# Patient Record
Sex: FEMALE | Race: WHITE | NOT HISPANIC OR LATINO | URBAN - METROPOLITAN AREA
[De-identification: names, ages, dates, MRNs, and addresses within clinical notes are randomized per-mention and may not be internally consistent; named-entity substitution may affect disease eponyms.]

---

## 2017-10-30 ENCOUNTER — INPATIENT (INPATIENT)
Facility: HOSPITAL | Age: 37
LOS: 3 days | Discharge: HOME | End: 2017-11-03
Attending: OBSTETRICS & GYNECOLOGY | Admitting: OBSTETRICS & GYNECOLOGY
Payer: MEDICAID

## 2017-10-30 VITALS
SYSTOLIC BLOOD PRESSURE: 141 MMHG | OXYGEN SATURATION: 100 % | RESPIRATION RATE: 16 BRPM | TEMPERATURE: 98 F | HEART RATE: 84 BPM | DIASTOLIC BLOOD PRESSURE: 94 MMHG

## 2017-10-30 DIAGNOSIS — D64.9 ANEMIA, UNSPECIFIED: ICD-10-CM

## 2017-10-30 LAB
ALBUMIN SERPL ELPH-MCNC: 2.9 G/DL — LOW (ref 3.3–5)
ALP SERPL-CCNC: 320 U/L — HIGH (ref 40–120)
ALT FLD-CCNC: 46 U/L — HIGH (ref 4–33)
ANISOCYTOSIS BLD QL: SLIGHT — SIGNIFICANT CHANGE UP
APTT BLD: 27.6 SEC — SIGNIFICANT CHANGE UP (ref 27.5–37.4)
AST SERPL-CCNC: 51 U/L — HIGH (ref 4–32)
BASE EXCESS BLDV CALC-SCNC: -1.1 MMOL/L — SIGNIFICANT CHANGE UP
BASOPHILS # BLD AUTO: 0.02 K/UL — SIGNIFICANT CHANGE UP (ref 0–0.2)
BASOPHILS # BLD AUTO: 0.04 K/UL — SIGNIFICANT CHANGE UP (ref 0–0.2)
BASOPHILS NFR BLD AUTO: 0.3 % — SIGNIFICANT CHANGE UP (ref 0–2)
BASOPHILS NFR BLD AUTO: 0.5 % — SIGNIFICANT CHANGE UP (ref 0–2)
BASOPHILS NFR SPEC: 3 % — HIGH (ref 0–2)
BILIRUB SERPL-MCNC: 0.3 MG/DL — SIGNIFICANT CHANGE UP (ref 0.2–1.2)
BLOOD GAS VENOUS - CREATININE: 0.71 MG/DL — SIGNIFICANT CHANGE UP (ref 0.5–1.3)
BUN SERPL-MCNC: 10 MG/DL — SIGNIFICANT CHANGE UP (ref 7–23)
CALCIUM SERPL-MCNC: 8.2 MG/DL — LOW (ref 8.4–10.5)
CHLORIDE BLDV-SCNC: 107 MMOL/L — SIGNIFICANT CHANGE UP (ref 96–108)
CHLORIDE SERPL-SCNC: 103 MMOL/L — SIGNIFICANT CHANGE UP (ref 98–107)
CO2 SERPL-SCNC: 22 MMOL/L — SIGNIFICANT CHANGE UP (ref 22–31)
CREAT ?TM UR-MCNC: 79.4 MG/DL — SIGNIFICANT CHANGE UP
CREAT SERPL-MCNC: 0.74 MG/DL — SIGNIFICANT CHANGE UP (ref 0.5–1.3)
EOSINOPHIL # BLD AUTO: 0.34 K/UL — SIGNIFICANT CHANGE UP (ref 0–0.5)
EOSINOPHIL # BLD AUTO: 0.49 K/UL — SIGNIFICANT CHANGE UP (ref 0–0.5)
EOSINOPHIL NFR BLD AUTO: 4 % — SIGNIFICANT CHANGE UP (ref 0–6)
EOSINOPHIL NFR BLD AUTO: 6.8 % — HIGH (ref 0–6)
EOSINOPHIL NFR FLD: 10 % — HIGH (ref 0–6)
FIBRINOGEN PPP-MCNC: 795 MG/DL — HIGH (ref 310–510)
GAS PNL BLDV: 135 MMOL/L — LOW (ref 136–146)
GLUCOSE BLDV-MCNC: 84 — SIGNIFICANT CHANGE UP (ref 70–99)
GLUCOSE SERPL-MCNC: 79 MG/DL — SIGNIFICANT CHANGE UP (ref 70–99)
HCG SERPL-ACNC: SIGNIFICANT CHANGE UP MIU/ML
HCO3 BLDV-SCNC: 22 MMOL/L — SIGNIFICANT CHANGE UP (ref 20–27)
HCT VFR BLD CALC: 24.5 % — LOW (ref 34.5–45)
HCT VFR BLD CALC: 26.2 % — LOW (ref 34.5–45)
HCT VFR BLDV CALC: 25.6 % — LOW (ref 34.5–45)
HGB BLD-MCNC: 7 G/DL — CRITICAL LOW (ref 11.5–15.5)
HGB BLD-MCNC: 8 G/DL — LOW (ref 11.5–15.5)
HGB BLDV-MCNC: 8.2 G/DL — LOW (ref 11.5–15.5)
HYPOCHROMIA BLD QL: SLIGHT — SIGNIFICANT CHANGE UP
IMM GRANULOCYTES # BLD AUTO: 0.03 # — SIGNIFICANT CHANGE UP
IMM GRANULOCYTES # BLD AUTO: 0.04 # — SIGNIFICANT CHANGE UP
IMM GRANULOCYTES NFR BLD AUTO: 0.4 % — SIGNIFICANT CHANGE UP (ref 0–1.5)
IMM GRANULOCYTES NFR BLD AUTO: 0.5 % — SIGNIFICANT CHANGE UP (ref 0–1.5)
INR BLD: 1.03 — SIGNIFICANT CHANGE UP (ref 0.88–1.17)
LACTATE BLDV-MCNC: 0.8 MMOL/L — SIGNIFICANT CHANGE UP (ref 0.5–2)
LDH SERPL L TO P-CCNC: 303 U/L — HIGH (ref 135–225)
LYMPHOCYTES # BLD AUTO: 3.06 K/UL — SIGNIFICANT CHANGE UP (ref 1–3.3)
LYMPHOCYTES # BLD AUTO: 3.11 K/UL — SIGNIFICANT CHANGE UP (ref 1–3.3)
LYMPHOCYTES # BLD AUTO: 36.5 % — SIGNIFICANT CHANGE UP (ref 13–44)
LYMPHOCYTES # BLD AUTO: 42.4 % — SIGNIFICANT CHANGE UP (ref 13–44)
LYMPHOCYTES NFR SPEC AUTO: 37 % — SIGNIFICANT CHANGE UP (ref 13–44)
MANUAL SMEAR VERIFICATION: YES — SIGNIFICANT CHANGE UP
MCHC RBC-ENTMCNC: 20.6 PG — LOW (ref 27–34)
MCHC RBC-ENTMCNC: 22.2 PG — LOW (ref 27–34)
MCHC RBC-ENTMCNC: 28.6 % — LOW (ref 32–36)
MCHC RBC-ENTMCNC: 30.5 % — LOW (ref 32–36)
MCV RBC AUTO: 72.3 FL — LOW (ref 80–100)
MCV RBC AUTO: 72.8 FL — LOW (ref 80–100)
MICROCYTES BLD QL: SLIGHT — SIGNIFICANT CHANGE UP
MONOCYTES # BLD AUTO: 0.74 K/UL — SIGNIFICANT CHANGE UP (ref 0–0.9)
MONOCYTES # BLD AUTO: 0.96 K/UL — HIGH (ref 0–0.9)
MONOCYTES NFR BLD AUTO: 10.3 % — SIGNIFICANT CHANGE UP (ref 2–14)
MONOCYTES NFR BLD AUTO: 11.3 % — SIGNIFICANT CHANGE UP (ref 2–14)
MONOCYTES NFR BLD: 7 % — SIGNIFICANT CHANGE UP (ref 2–9)
NEUTROPHIL AB SER-ACNC: 38 % — LOW (ref 43–77)
NEUTROPHILS # BLD AUTO: 2.87 K/UL — SIGNIFICANT CHANGE UP (ref 1.8–7.4)
NEUTROPHILS # BLD AUTO: 4.02 K/UL — SIGNIFICANT CHANGE UP (ref 1.8–7.4)
NEUTROPHILS NFR BLD AUTO: 39.8 % — LOW (ref 43–77)
NEUTROPHILS NFR BLD AUTO: 47.2 % — SIGNIFICANT CHANGE UP (ref 43–77)
NRBC # BLD: 3 /100WBC — SIGNIFICANT CHANGE UP
NRBC # FLD: 0.03 — SIGNIFICANT CHANGE UP
NRBC # FLD: 0.05 — SIGNIFICANT CHANGE UP
PCO2 BLDV: 43 MMHG — SIGNIFICANT CHANGE UP (ref 41–51)
PH BLDV: 7.36 PH — SIGNIFICANT CHANGE UP (ref 7.32–7.43)
PLATELET # BLD AUTO: 346 K/UL — SIGNIFICANT CHANGE UP (ref 150–400)
PLATELET # BLD AUTO: 372 K/UL — SIGNIFICANT CHANGE UP (ref 150–400)
PLATELET COUNT - ESTIMATE: NORMAL — SIGNIFICANT CHANGE UP
PMV BLD: 11.5 FL — SIGNIFICANT CHANGE UP (ref 7–13)
PMV BLD: 11.5 FL — SIGNIFICANT CHANGE UP (ref 7–13)
PO2 BLDV: < 24 MMHG — LOW (ref 35–40)
POIKILOCYTOSIS BLD QL AUTO: SLIGHT — SIGNIFICANT CHANGE UP
POTASSIUM BLDV-SCNC: 3.8 MMOL/L — SIGNIFICANT CHANGE UP (ref 3.4–4.5)
POTASSIUM SERPL-MCNC: 4.1 MMOL/L — SIGNIFICANT CHANGE UP (ref 3.5–5.3)
POTASSIUM SERPL-SCNC: 4.1 MMOL/L — SIGNIFICANT CHANGE UP (ref 3.5–5.3)
PROT SERPL-MCNC: 6.7 G/DL — SIGNIFICANT CHANGE UP (ref 6–8.3)
PROT UR-MCNC: 19 MG/DL — SIGNIFICANT CHANGE UP
PROTHROM AB SERPL-ACNC: 11.5 SEC — SIGNIFICANT CHANGE UP (ref 9.8–13.1)
RBC # BLD: 3.39 M/UL — LOW (ref 3.8–5.2)
RBC # BLD: 3.6 M/UL — LOW (ref 3.8–5.2)
RBC # FLD: 17.4 % — HIGH (ref 10.3–14.5)
RBC # FLD: 18.4 % — HIGH (ref 10.3–14.5)
RH IG SCN BLD-IMP: POSITIVE — SIGNIFICANT CHANGE UP
SAO2 % BLDV: 18.8 % — LOW (ref 60–85)
SODIUM SERPL-SCNC: 137 MMOL/L — SIGNIFICANT CHANGE UP (ref 135–145)
URATE SERPL-MCNC: 4.9 MG/DL — SIGNIFICANT CHANGE UP (ref 2.5–7)
VARIANT LYMPHS # BLD: 5 % — SIGNIFICANT CHANGE UP
WBC # BLD: 7.21 K/UL — SIGNIFICANT CHANGE UP (ref 3.8–10.5)
WBC # BLD: 8.51 K/UL — SIGNIFICANT CHANGE UP (ref 3.8–10.5)
WBC # FLD AUTO: 7.21 K/UL — SIGNIFICANT CHANGE UP (ref 3.8–10.5)
WBC # FLD AUTO: 8.51 K/UL — SIGNIFICANT CHANGE UP (ref 3.8–10.5)

## 2017-10-30 RX ORDER — SODIUM CHLORIDE 9 MG/ML
1000 INJECTION, SOLUTION INTRAVENOUS ONCE
Qty: 0 | Refills: 0 | Status: COMPLETED | OUTPATIENT
Start: 2017-10-30 | End: 2017-10-30

## 2017-10-30 RX ORDER — HYDROMORPHONE HYDROCHLORIDE 2 MG/ML
1 INJECTION INTRAMUSCULAR; INTRAVENOUS; SUBCUTANEOUS
Qty: 0 | Refills: 0 | Status: DISCONTINUED | OUTPATIENT
Start: 2017-10-31 | End: 2017-11-01

## 2017-10-30 RX ORDER — METOCLOPRAMIDE HCL 10 MG
10 TABLET ORAL ONCE
Qty: 0 | Refills: 0 | Status: COMPLETED | OUTPATIENT
Start: 2017-10-30 | End: 2017-10-30

## 2017-10-30 RX ORDER — MAGNESIUM SULFATE 500 MG/ML
4 VIAL (ML) INJECTION ONCE
Qty: 0 | Refills: 0 | Status: COMPLETED | OUTPATIENT
Start: 2017-10-30 | End: 2017-10-30

## 2017-10-30 RX ORDER — OXYCODONE HYDROCHLORIDE 5 MG/1
10 TABLET ORAL
Qty: 0 | Refills: 0 | Status: DISCONTINUED | OUTPATIENT
Start: 2017-10-31 | End: 2017-11-01

## 2017-10-30 RX ORDER — INFLUENZA VIRUS VACCINE 15; 15; 15; 15 UG/.5ML; UG/.5ML; UG/.5ML; UG/.5ML
0.5 SUSPENSION INTRAMUSCULAR ONCE
Qty: 0 | Refills: 0 | Status: COMPLETED | OUTPATIENT
Start: 2017-10-30 | End: 2017-10-30

## 2017-10-30 RX ORDER — MAGNESIUM SULFATE 500 MG/ML
2 VIAL (ML) INJECTION
Qty: 40 | Refills: 0 | Status: DISCONTINUED | OUTPATIENT
Start: 2017-10-30 | End: 2017-10-31

## 2017-10-30 RX ORDER — CITRIC ACID/SODIUM CITRATE 300-500 MG
30 SOLUTION, ORAL ORAL ONCE
Qty: 0 | Refills: 0 | Status: COMPLETED | OUTPATIENT
Start: 2017-10-30 | End: 2017-10-30

## 2017-10-30 RX ORDER — OXYCODONE HYDROCHLORIDE 5 MG/1
5 TABLET ORAL
Qty: 0 | Refills: 0 | Status: DISCONTINUED | OUTPATIENT
Start: 2017-10-31 | End: 2017-11-01

## 2017-10-30 RX ORDER — SODIUM CHLORIDE 9 MG/ML
1000 INJECTION, SOLUTION INTRAVENOUS
Qty: 0 | Refills: 0 | Status: DISCONTINUED | OUTPATIENT
Start: 2017-10-30 | End: 2017-10-31

## 2017-10-30 RX ADMIN — Medication 50 GM/HR: at 21:46

## 2017-10-30 RX ADMIN — OXYCODONE HYDROCHLORIDE 5 MILLIGRAM(S): 5 TABLET ORAL at 23:30

## 2017-10-30 RX ADMIN — SODIUM CHLORIDE 125 MILLILITER(S): 9 INJECTION, SOLUTION INTRAVENOUS at 21:14

## 2017-10-30 RX ADMIN — Medication 30 MILLILITER(S): at 23:50

## 2017-10-30 RX ADMIN — Medication 10 MILLIGRAM(S): at 23:49

## 2017-10-30 RX ADMIN — Medication 300 GRAM(S): at 21:14

## 2017-10-30 RX ADMIN — SODIUM CHLORIDE 2000 MILLILITER(S): 9 INJECTION, SOLUTION INTRAVENOUS at 23:28

## 2017-10-30 NOTE — ED PROVIDER NOTE - MEDICAL DECISION MAKING DETAILS
37yoF 37+weeks pregnant, pw symptomatic anemia with bilateral lower extremity edema. will send labs, type and screen, dvt studies, and likely admit to obgyn. [place]: 38 yo F who is 37+ weeks pregnant, p/w symptomatic anemia with b/l lower extremity edema.   -labs, type and screen, b/l LEs are neg for DVT, admit to obgyn.

## 2017-10-30 NOTE — ED ADULT TRIAGE NOTE - CHIEF COMPLAINT QUOTE
pt amb to triage c/o low Hgb as per labs, Hgb shown to be 6.7, pt denies bleeding, appox 36 weeks pregnant, c/o lower extremity swelling, SOB, and weak

## 2017-10-30 NOTE — ED PROVIDER NOTE - OBJECTIVE STATEMENT
37yoF  (triplets and 2 singles) now 37weeks + pregnant sent in by obgyn for anemia. patient has been having worsening sob on exertion and increased weakness, associated with lower extremity swelling for 2 weeks. weakness is worse with exertion. swelling is worse with sitting. no feveers, chills, vaginal bleeding, discharge, chest pain, abd pain or other issues.

## 2017-10-30 NOTE — ED ADULT NURSE NOTE - OBJECTIVE STATEMENT
pt received a&ox3, 36 weeks pregnant , pt c/o increased SOB, bilateral lower leg edema/Dyspnea on exertion x 2 weeks, pt has hx of anemia, sent in by PMD for symptoms and low hemoglobin, pt denies dizziness/weakness at present time, breathing even and unlabored, legs noted to be edematous, pt is high risk pregnancy due to previous anemia during pregnancy and previous triplets, pt appears to be in NAD, md at bedside, pt noted to be hypertensive denies hx of hypertension/preeclampsia throughout any pregnancy, vs as reported, 20 gauge IV placed in right ac, labs drawn and sent, will continue to monitor. pt received a&ox3, 36 weeks pregnant , pt c/o increased SOB, bilateral lower leg edema/Dyspnea on exertion x 2 weeks, pt has hx of anemia, sent in by PMD for symptoms and low hemoglobin, pt denies dizziness/weakness at present time, breathing even and unlabored, legs noted to be edematous, pt is high risk pregnancy due to previous anemia during pregnancy and previous triplets, pt denies vaginal bleeding, states she has been having intermittent non organized contractions x 1 month, pt appears to be in NAD, md at bedside, pt noted to be hypertensive denies hx of hypertension/preeclampsia throughout any pregnancy, vs as reported, 20 gauge IV placed in right ac, labs drawn and sent, will continue to monitor.

## 2017-10-30 NOTE — ED PROVIDER NOTE - PROGRESS NOTE DETAILS
Dr. Baez: 37F 36 weeks pregnant p/w sob, anemia Hgb 6.7 and some abdo pain. Advised triage RN to place pt in main. D/w Dr. Shelton and Dr. Mendoza, code ob to be called at [id number] [address] - code OB called at 14:15. OB bedside at 14:35.

## 2017-10-31 DIAGNOSIS — O14.93 UNSPECIFIED PRE-ECLAMPSIA, THIRD TRIMESTER: ICD-10-CM

## 2017-10-31 LAB
ALBUMIN SERPL ELPH-MCNC: 2.9 G/DL — LOW (ref 3.3–5)
ALP SERPL-CCNC: 274 U/L — HIGH (ref 40–120)
ALT FLD-CCNC: 38 U/L — HIGH (ref 4–33)
APPEARANCE UR: CLEAR — SIGNIFICANT CHANGE UP
APTT BLD: 28.3 SEC — SIGNIFICANT CHANGE UP (ref 27.5–37.4)
AST SERPL-CCNC: 42 U/L — HIGH (ref 4–32)
BASOPHILS # BLD AUTO: 0.02 K/UL — SIGNIFICANT CHANGE UP (ref 0–0.2)
BASOPHILS NFR BLD AUTO: 0.1 % — SIGNIFICANT CHANGE UP (ref 0–2)
BILIRUB SERPL-MCNC: 0.5 MG/DL — SIGNIFICANT CHANGE UP (ref 0.2–1.2)
BILIRUB UR-MCNC: NEGATIVE — SIGNIFICANT CHANGE UP
BLOOD UR QL VISUAL: NEGATIVE — SIGNIFICANT CHANGE UP
BUN SERPL-MCNC: 9 MG/DL — SIGNIFICANT CHANGE UP (ref 7–23)
CALCIUM SERPL-MCNC: 7.6 MG/DL — LOW (ref 8.4–10.5)
CHLORIDE SERPL-SCNC: 103 MMOL/L — SIGNIFICANT CHANGE UP (ref 98–107)
CO2 SERPL-SCNC: 22 MMOL/L — SIGNIFICANT CHANGE UP (ref 22–31)
COLOR SPEC: SIGNIFICANT CHANGE UP
CREAT SERPL-MCNC: 0.58 MG/DL — SIGNIFICANT CHANGE UP (ref 0.5–1.3)
EOSINOPHIL # BLD AUTO: 0.04 K/UL — SIGNIFICANT CHANGE UP (ref 0–0.5)
EOSINOPHIL NFR BLD AUTO: 0.3 % — SIGNIFICANT CHANGE UP (ref 0–6)
FIBRINOGEN PPP-MCNC: 607.3 MG/DL — HIGH (ref 310–510)
GLUCOSE SERPL-MCNC: 116 MG/DL — HIGH (ref 70–99)
GLUCOSE UR-MCNC: NEGATIVE — SIGNIFICANT CHANGE UP
HCT VFR BLD CALC: 30.1 % — LOW (ref 34.5–45)
HGB BLD-MCNC: 9.5 G/DL — LOW (ref 11.5–15.5)
IMM GRANULOCYTES # BLD AUTO: 0.09 # — SIGNIFICANT CHANGE UP
IMM GRANULOCYTES NFR BLD AUTO: 0.6 % — SIGNIFICANT CHANGE UP (ref 0–1.5)
INR BLD: 0.98 — SIGNIFICANT CHANGE UP (ref 0.88–1.17)
KETONES UR-MCNC: NEGATIVE — SIGNIFICANT CHANGE UP
LDH SERPL L TO P-CCNC: 334 U/L — HIGH (ref 135–225)
LEUKOCYTE ESTERASE UR-ACNC: NEGATIVE — SIGNIFICANT CHANGE UP
LYMPHOCYTES # BLD AUTO: 1.57 K/UL — SIGNIFICANT CHANGE UP (ref 1–3.3)
LYMPHOCYTES # BLD AUTO: 10.2 % — LOW (ref 13–44)
MCHC RBC-ENTMCNC: 23.2 PG — LOW (ref 27–34)
MCHC RBC-ENTMCNC: 31.6 % — LOW (ref 32–36)
MCV RBC AUTO: 73.4 FL — LOW (ref 80–100)
MONOCYTES # BLD AUTO: 0.63 K/UL — SIGNIFICANT CHANGE UP (ref 0–0.9)
MONOCYTES NFR BLD AUTO: 4.1 % — SIGNIFICANT CHANGE UP (ref 2–14)
MUCOUS THREADS # UR AUTO: SIGNIFICANT CHANGE UP
NEUTROPHILS # BLD AUTO: 12.97 K/UL — HIGH (ref 1.8–7.4)
NEUTROPHILS NFR BLD AUTO: 84.7 % — HIGH (ref 43–77)
NITRITE UR-MCNC: NEGATIVE — SIGNIFICANT CHANGE UP
NRBC # FLD: 0.06 — SIGNIFICANT CHANGE UP
PH UR: 7.5 — SIGNIFICANT CHANGE UP (ref 4.6–8)
PLATELET # BLD AUTO: 319 K/UL — SIGNIFICANT CHANGE UP (ref 150–400)
PMV BLD: 11.3 FL — SIGNIFICANT CHANGE UP (ref 7–13)
POTASSIUM SERPL-MCNC: 3.9 MMOL/L — SIGNIFICANT CHANGE UP (ref 3.5–5.3)
POTASSIUM SERPL-SCNC: 3.9 MMOL/L — SIGNIFICANT CHANGE UP (ref 3.5–5.3)
PROT SERPL-MCNC: 6.1 G/DL — SIGNIFICANT CHANGE UP (ref 6–8.3)
PROT UR-MCNC: NEGATIVE — SIGNIFICANT CHANGE UP
PROTHROM AB SERPL-ACNC: 11 SEC — SIGNIFICANT CHANGE UP (ref 9.8–13.1)
RBC # BLD: 4.1 M/UL — SIGNIFICANT CHANGE UP (ref 3.8–5.2)
RBC # FLD: 19.4 % — HIGH (ref 10.3–14.5)
RBC CASTS # UR COMP ASSIST: SIGNIFICANT CHANGE UP (ref 0–?)
SODIUM SERPL-SCNC: 138 MMOL/L — SIGNIFICANT CHANGE UP (ref 135–145)
SP GR SPEC: 1.01 — SIGNIFICANT CHANGE UP (ref 1–1.03)
SQUAMOUS # UR AUTO: SIGNIFICANT CHANGE UP
T PALLIDUM AB TITR SER: NEGATIVE — SIGNIFICANT CHANGE UP
URATE SERPL-MCNC: 4.7 MG/DL — SIGNIFICANT CHANGE UP (ref 2.5–7)
UROBILINOGEN FLD QL: NORMAL E.U. — SIGNIFICANT CHANGE UP (ref 0.1–0.2)
WBC # BLD: 15.32 K/UL — HIGH (ref 3.8–10.5)
WBC # FLD AUTO: 15.32 K/UL — HIGH (ref 3.8–10.5)
WBC UR QL: SIGNIFICANT CHANGE UP (ref 0–?)

## 2017-10-31 RX ORDER — HYDROMORPHONE HYDROCHLORIDE 2 MG/ML
1 INJECTION INTRAMUSCULAR; INTRAVENOUS; SUBCUTANEOUS ONCE
Qty: 0 | Refills: 0 | Status: DISCONTINUED | OUTPATIENT
Start: 2017-10-31 | End: 2017-10-31

## 2017-10-31 RX ORDER — ACETAMINOPHEN 500 MG
650 TABLET ORAL ONCE
Qty: 0 | Refills: 0 | Status: COMPLETED | OUTPATIENT
Start: 2017-10-31 | End: 2017-10-31

## 2017-10-31 RX ORDER — ACETAMINOPHEN 500 MG
975 TABLET ORAL EVERY 6 HOURS
Qty: 0 | Refills: 0 | Status: DISCONTINUED | OUTPATIENT
Start: 2017-10-31 | End: 2017-11-03

## 2017-10-31 RX ORDER — GLYCERIN ADULT
1 SUPPOSITORY, RECTAL RECTAL AT BEDTIME
Qty: 0 | Refills: 0 | Status: DISCONTINUED | OUTPATIENT
Start: 2017-10-31 | End: 2017-11-03

## 2017-10-31 RX ORDER — SODIUM CHLORIDE 9 MG/ML
1000 INJECTION, SOLUTION INTRAVENOUS
Qty: 0 | Refills: 0 | Status: DISCONTINUED | OUTPATIENT
Start: 2017-10-31 | End: 2017-11-01

## 2017-10-31 RX ORDER — LABETALOL HCL 100 MG
100 TABLET ORAL ONCE
Qty: 0 | Refills: 0 | Status: COMPLETED | OUTPATIENT
Start: 2017-10-31 | End: 2017-10-31

## 2017-10-31 RX ORDER — MAGNESIUM SULFATE 500 MG/ML
2 VIAL (ML) INJECTION
Qty: 40 | Refills: 0 | Status: DISCONTINUED | OUTPATIENT
Start: 2017-10-31 | End: 2017-10-31

## 2017-10-31 RX ORDER — LABETALOL HCL 100 MG
300 TABLET ORAL THREE TIMES A DAY
Qty: 0 | Refills: 0 | Status: DISCONTINUED | OUTPATIENT
Start: 2017-10-31 | End: 2017-11-03

## 2017-10-31 RX ORDER — OXYTOCIN 10 UNIT/ML
333.33 VIAL (ML) INJECTION
Qty: 20 | Refills: 0 | Status: COMPLETED | OUTPATIENT
Start: 2017-10-31 | End: 2017-10-31

## 2017-10-31 RX ORDER — HYDRALAZINE HCL 50 MG
5 TABLET ORAL ONCE
Qty: 0 | Refills: 0 | Status: DISCONTINUED | OUTPATIENT
Start: 2017-10-31 | End: 2017-10-31

## 2017-10-31 RX ORDER — TETANUS TOXOID, REDUCED DIPHTHERIA TOXOID AND ACELLULAR PERTUSSIS VACCINE, ADSORBED 5; 2.5; 8; 8; 2.5 [IU]/.5ML; [IU]/.5ML; UG/.5ML; UG/.5ML; UG/.5ML
0.5 SUSPENSION INTRAMUSCULAR ONCE
Qty: 0 | Refills: 0 | Status: DISCONTINUED | OUTPATIENT
Start: 2017-10-31 | End: 2017-11-03

## 2017-10-31 RX ORDER — KETOROLAC TROMETHAMINE 30 MG/ML
30 SYRINGE (ML) INJECTION EVERY 6 HOURS
Qty: 0 | Refills: 0 | Status: DISCONTINUED | OUTPATIENT
Start: 2017-10-31 | End: 2017-11-01

## 2017-10-31 RX ORDER — OXYTOCIN 10 UNIT/ML
16.67 VIAL (ML) INJECTION
Qty: 20 | Refills: 0 | Status: COMPLETED | OUTPATIENT
Start: 2017-10-31 | End: 2017-10-31

## 2017-10-31 RX ORDER — OXYTOCIN 10 UNIT/ML
41.67 VIAL (ML) INJECTION
Qty: 20 | Refills: 0 | Status: DISCONTINUED | OUTPATIENT
Start: 2017-10-31 | End: 2017-11-01

## 2017-10-31 RX ORDER — SIMETHICONE 80 MG/1
80 TABLET, CHEWABLE ORAL EVERY 4 HOURS
Qty: 0 | Refills: 0 | Status: DISCONTINUED | OUTPATIENT
Start: 2017-10-31 | End: 2017-11-03

## 2017-10-31 RX ORDER — SODIUM CHLORIDE 9 MG/ML
1000 INJECTION, SOLUTION INTRAVENOUS
Qty: 0 | Refills: 0 | Status: DISCONTINUED | OUTPATIENT
Start: 2017-10-31 | End: 2017-10-31

## 2017-10-31 RX ORDER — LANOLIN
1 OINTMENT (GRAM) TOPICAL
Qty: 0 | Refills: 0 | Status: DISCONTINUED | OUTPATIENT
Start: 2017-10-31 | End: 2017-11-03

## 2017-10-31 RX ORDER — LABETALOL HCL 100 MG
200 TABLET ORAL EVERY 8 HOURS
Qty: 0 | Refills: 0 | Status: DISCONTINUED | OUTPATIENT
Start: 2017-10-31 | End: 2017-10-31

## 2017-10-31 RX ORDER — NALOXONE HYDROCHLORIDE 4 MG/.1ML
0.1 SPRAY NASAL
Qty: 0 | Refills: 0 | Status: DISCONTINUED | OUTPATIENT
Start: 2017-10-31 | End: 2017-11-01

## 2017-10-31 RX ORDER — LABETALOL HCL 100 MG
20 TABLET ORAL ONCE
Qty: 0 | Refills: 0 | Status: COMPLETED | OUTPATIENT
Start: 2017-10-31 | End: 2017-10-31

## 2017-10-31 RX ORDER — LABETALOL HCL 100 MG
40 TABLET ORAL ONCE
Qty: 0 | Refills: 0 | Status: DISCONTINUED | OUTPATIENT
Start: 2017-10-31 | End: 2017-10-31

## 2017-10-31 RX ORDER — ONDANSETRON 8 MG/1
4 TABLET, FILM COATED ORAL EVERY 6 HOURS
Qty: 0 | Refills: 0 | Status: DISCONTINUED | OUTPATIENT
Start: 2017-10-31 | End: 2017-11-01

## 2017-10-31 RX ORDER — HYDRALAZINE HCL 50 MG
5 TABLET ORAL ONCE
Qty: 0 | Refills: 0 | Status: COMPLETED | OUTPATIENT
Start: 2017-10-31 | End: 2017-10-31

## 2017-10-31 RX ORDER — ACETAMINOPHEN 500 MG
1000 TABLET ORAL ONCE
Qty: 0 | Refills: 0 | Status: COMPLETED | OUTPATIENT
Start: 2017-10-31 | End: 2017-10-31

## 2017-10-31 RX ORDER — DIPHENHYDRAMINE HCL 50 MG
25 CAPSULE ORAL EVERY 6 HOURS
Qty: 0 | Refills: 0 | Status: DISCONTINUED | OUTPATIENT
Start: 2017-10-31 | End: 2017-11-03

## 2017-10-31 RX ORDER — FERROUS SULFATE 325(65) MG
325 TABLET ORAL DAILY
Qty: 0 | Refills: 0 | Status: DISCONTINUED | OUTPATIENT
Start: 2017-10-31 | End: 2017-11-01

## 2017-10-31 RX ORDER — HEPARIN SODIUM 5000 [USP'U]/ML
5000 INJECTION INTRAVENOUS; SUBCUTANEOUS EVERY 12 HOURS
Qty: 0 | Refills: 0 | Status: DISCONTINUED | OUTPATIENT
Start: 2017-10-31 | End: 2017-11-03

## 2017-10-31 RX ORDER — DOCUSATE SODIUM 100 MG
100 CAPSULE ORAL
Qty: 0 | Refills: 0 | Status: DISCONTINUED | OUTPATIENT
Start: 2017-10-31 | End: 2017-11-01

## 2017-10-31 RX ORDER — BUTORPHANOL TARTRATE 2 MG/ML
0.25 INJECTION, SOLUTION INTRAMUSCULAR; INTRAVENOUS EVERY 6 HOURS
Qty: 0 | Refills: 0 | Status: DISCONTINUED | OUTPATIENT
Start: 2017-10-31 | End: 2017-11-01

## 2017-10-31 RX ADMIN — OXYCODONE HYDROCHLORIDE 10 MILLIGRAM(S): 5 TABLET ORAL at 13:06

## 2017-10-31 RX ADMIN — Medication 300 MILLIGRAM(S): at 20:10

## 2017-10-31 RX ADMIN — HYDROMORPHONE HYDROCHLORIDE 1 MILLIGRAM(S): 2 INJECTION INTRAMUSCULAR; INTRAVENOUS; SUBCUTANEOUS at 06:22

## 2017-10-31 RX ADMIN — Medication 30 MILLIGRAM(S): at 13:33

## 2017-10-31 RX ADMIN — Medication 50 MILLIUNIT(S)/MIN: at 06:06

## 2017-10-31 RX ADMIN — OXYCODONE HYDROCHLORIDE 10 MILLIGRAM(S): 5 TABLET ORAL at 18:02

## 2017-10-31 RX ADMIN — Medication 30 MILLIGRAM(S): at 21:30

## 2017-10-31 RX ADMIN — Medication 20 MILLIGRAM(S): at 03:40

## 2017-10-31 RX ADMIN — OXYCODONE HYDROCHLORIDE 5 MILLIGRAM(S): 5 TABLET ORAL at 18:39

## 2017-10-31 RX ADMIN — Medication 50 GM/HR: at 07:46

## 2017-10-31 RX ADMIN — HEPARIN SODIUM 5000 UNIT(S): 5000 INJECTION INTRAVENOUS; SUBCUTANEOUS at 20:21

## 2017-10-31 RX ADMIN — Medication 975 MILLIGRAM(S): at 17:50

## 2017-10-31 RX ADMIN — Medication 30 MILLIGRAM(S): at 20:21

## 2017-10-31 RX ADMIN — OXYCODONE HYDROCHLORIDE 5 MILLIGRAM(S): 5 TABLET ORAL at 22:51

## 2017-10-31 RX ADMIN — Medication 975 MILLIGRAM(S): at 18:03

## 2017-10-31 RX ADMIN — SODIUM CHLORIDE 50 MILLILITER(S): 9 INJECTION, SOLUTION INTRAVENOUS at 09:22

## 2017-10-31 RX ADMIN — Medication 100 MILLIGRAM(S): at 05:42

## 2017-10-31 RX ADMIN — Medication 650 MILLIGRAM(S): at 04:54

## 2017-10-31 RX ADMIN — OXYCODONE HYDROCHLORIDE 5 MILLIGRAM(S): 5 TABLET ORAL at 10:40

## 2017-10-31 RX ADMIN — Medication 300 MILLIGRAM(S): at 11:10

## 2017-10-31 RX ADMIN — SODIUM CHLORIDE 100 MILLILITER(S): 9 INJECTION, SOLUTION INTRAVENOUS at 18:21

## 2017-10-31 RX ADMIN — HEPARIN SODIUM 5000 UNIT(S): 5000 INJECTION INTRAVENOUS; SUBCUTANEOUS at 08:15

## 2017-10-31 RX ADMIN — Medication 50 GM/HR: at 02:34

## 2017-10-31 RX ADMIN — Medication 400 MILLIGRAM(S): at 13:40

## 2017-10-31 RX ADMIN — Medication 200 MILLIGRAM(S): at 03:25

## 2017-10-31 RX ADMIN — OXYCODONE HYDROCHLORIDE 10 MILLIGRAM(S): 5 TABLET ORAL at 09:54

## 2017-10-31 RX ADMIN — Medication 650 MILLIGRAM(S): at 06:22

## 2017-10-31 RX ADMIN — HYDROMORPHONE HYDROCHLORIDE 1 MILLIGRAM(S): 2 INJECTION INTRAMUSCULAR; INTRAVENOUS; SUBCUTANEOUS at 04:54

## 2017-10-31 RX ADMIN — OXYCODONE HYDROCHLORIDE 10 MILLIGRAM(S): 5 TABLET ORAL at 11:51

## 2017-10-31 RX ADMIN — OXYCODONE HYDROCHLORIDE 10 MILLIGRAM(S): 5 TABLET ORAL at 08:10

## 2017-10-31 RX ADMIN — Medication 1000 MILLIUNIT(S)/MIN: at 02:34

## 2017-10-31 RX ADMIN — OXYCODONE HYDROCHLORIDE 10 MILLIGRAM(S): 5 TABLET ORAL at 17:08

## 2017-10-31 RX ADMIN — Medication 30 MILLIGRAM(S): at 13:45

## 2017-10-31 RX ADMIN — Medication 1000 MILLIGRAM(S): at 13:45

## 2017-10-31 RX ADMIN — Medication 50 MILLIUNIT(S)/MIN: at 02:33

## 2017-10-31 RX ADMIN — Medication 5 MILLIGRAM(S): at 03:58

## 2017-10-31 RX ADMIN — HYDROMORPHONE HYDROCHLORIDE 1 MILLIGRAM(S): 2 INJECTION INTRAMUSCULAR; INTRAVENOUS; SUBCUTANEOUS at 03:30

## 2017-10-31 RX ADMIN — OXYCODONE HYDROCHLORIDE 5 MILLIGRAM(S): 5 TABLET ORAL at 09:51

## 2017-10-31 RX ADMIN — OXYCODONE HYDROCHLORIDE 5 MILLIGRAM(S): 5 TABLET ORAL at 18:28

## 2017-10-31 NOTE — PROGRESS NOTE ADULT - ASSESSMENT
36y/o  POD#0 from [facility], [place] for pre-eclampsia with severe features in stable condition. Current issues include pre-eclampsia and anemia. Patient currently asymptomatic. Most recent /97.

## 2017-10-31 NOTE — DISCHARGE NOTE OB - CARE PROVIDER_API CALL
Ashley Weinberg), Obstetrics and Gynecology  [address]  [address]  [place] [address]  Phone: [phone]  Fax: [phone] Jamel Coronado), Obstetrics  Gynecology  [address]  [address]  [place] [address]  Phone: [phone]  Fax: [phone]

## 2017-10-31 NOTE — PROGRESS NOTE ADULT - SUBJECTIVE AND OBJECTIVE BOX
Patient seen and examined at bedside. Patient re-evaluated for persistently severe range blood pressures post-op. Patient has no acute complaints. Patient reports pain, but per nursing had just received a dose of dilaudid. Patient denies HA, changes in vision, weakness, nausea, vomiting, CP, SOB, vaginal bleeding.    Vital Signs Last 24 Hours  T(C): 36.4 (10-31-17 @ 01:35), Max: 36.7 (10-30-17 @ 13:28)  HR: 55 (10-31-17 @ 03:15) (55 - 90)  BP: 167/102 (10-31-17 @ 03:15) (121/82 - 167/102)  RR: 16 (10-31-17 @ 01:56) (16 - 23)  SpO2: 97% (10-31-17 @ 01:56) (96% - 100%)    I&O's Summary    30 Oct 2017 07:01  -  31 Oct 2017 04:09  --------------------------------------------------------  IN: 2750 mL / OUT: 3575 mL / NET: -825 mL        Physical Exam:  General: NAD  CV: RRR, no murmurs, HR 55, radial and dorsalis pedis pulses 2+ b/l  Resp: CTA  Abdomen: Soft, appropriately tender, fundus firm  Incision: Pfannenstiel incision CDI, subcuticular suture closure, dressing in place  Pelvic: Lochia wnl, no vaginal bleeding with fundal massage  MSK: Brachial/Radial/Patellar reflexes 2+ b/l, no calf tenderness    Labs:    Blood Type: O Positive  Antibody Screen: --               8.0    8.51  )-----------( 346      ( 10-30 @ 21:06 )             26.2                7.0    7.21  )-----------( 372      ( 10-30 @ 14:00 )             24.5         MEDICATIONS  (STANDING):  influenza   Vaccine 0.5 milliLiter(s) IntraMuscular once  labetalol 200 milliGRAM(s) Oral every 8 hours  labetalol Injectable 20 milliGRAM(s) IV Push once  labetalol Injectable 40 milliGRAM(s) IV Push once  lactated ringers. 1000 milliLiter(s) (50 mL/Hr) IV Continuous <Continuous>  magnesium sulfate Infusion 2 Gm/Hr (50 mL/Hr) IV Continuous <Continuous>  oxytocin Infusion 16.667 milliUNIT(s)/Min (50 mL/Hr) IV Continuous <Continuous>    MEDICATIONS  (PRN):  HYDROmorphone  Injectable 1 milliGRAM(s) IV Push every 3 hours PRN Severe Pain

## 2017-10-31 NOTE — PROGRESS NOTE ADULT - PROBLEM SELECTOR PLAN 1
- 5mg IV hydralazine now 2/2 low HR. Retake BP in 20 minutes  - Continue with labetalol 200mg TID   - Continue with magnesium until 24 hrs post delivery (11/1@12a)  - HELLP labs  - Regular post-partum management    d/w Dr. Feldman and Dr. Paddy Rausch pgy1

## 2017-10-31 NOTE — DISCHARGE NOTE OB - MEDICATION SUMMARY - MEDICATIONS TO TAKE
I will START or STAY ON the medications listed below when I get home from the hospital:  None I will START or STAY ON the medications listed below when I get home from the hospital:    acetaminophen 325 mg oral tablet  -- 3 tab(s) by mouth every 6 hours, As Needed  -- Indication: For Pain    ibuprofen 600 mg oral tablet  -- 1 tab(s) by mouth every 6 hours, As Needed  -- Indication: For Pain    Prenatal Multivitamins with Folic Acid 1 mg oral tablet  -- 1 tab(s) by mouth once a day  -- Indication: For vitamins

## 2017-10-31 NOTE — DISCHARGE NOTE OB - HOSPITAL COURSE
38 yo G4 now P6 s/p RLTCS @ 38wga pt was sent to ER for blood transfusion due to anemia.  While in ER pt with persistent elevated BP's, found to have elevated LFTs.  RLTCS due to preeclampsia started on MgSo4.  Pt received 2units PRBCs.

## 2017-10-31 NOTE — DISCHARGE NOTE OB - PATIENT PORTAL LINK FT
“You can access the [facility] Patient Portal, offered by [facility], by registering with the following website: [email or web address]”

## 2017-10-31 NOTE — DISCHARGE NOTE OB - CARE PLAN
Principal Discharge DX:	 delivery delivered  Goal:	pregnancy delivered  Instructions for follow-up, activity and diet:	diet and activity as tolerated  Secondary Diagnosis:	Other iron deficiency anemia  Goal:	s/p blood transfusion

## 2017-11-01 LAB
HCT VFR BLD CALC: 23.2 % — LOW (ref 34.5–45)
HGB BLD-MCNC: 7.2 G/DL — LOW (ref 11.5–15.5)
MCHC RBC-ENTMCNC: 22.9 PG — LOW (ref 27–34)
MCHC RBC-ENTMCNC: 31 % — LOW (ref 32–36)
MCV RBC AUTO: 73.9 FL — LOW (ref 80–100)
NRBC # FLD: 0.03 — SIGNIFICANT CHANGE UP
PLATELET # BLD AUTO: 327 K/UL — SIGNIFICANT CHANGE UP (ref 150–400)
PMV BLD: 11.7 FL — SIGNIFICANT CHANGE UP (ref 7–13)
RBC # BLD: 3.14 M/UL — LOW (ref 3.8–5.2)
RBC # FLD: 20.1 % — HIGH (ref 10.3–14.5)
WBC # BLD: 13.66 K/UL — HIGH (ref 3.8–10.5)
WBC # FLD AUTO: 13.66 K/UL — HIGH (ref 3.8–10.5)

## 2017-11-01 RX ORDER — IBUPROFEN 200 MG
600 TABLET ORAL EVERY 6 HOURS
Qty: 0 | Refills: 0 | Status: DISCONTINUED | OUTPATIENT
Start: 2017-11-01 | End: 2017-11-03

## 2017-11-01 RX ORDER — OXYCODONE HYDROCHLORIDE 5 MG/1
5 TABLET ORAL EVERY 4 HOURS
Qty: 0 | Refills: 0 | Status: DISCONTINUED | OUTPATIENT
Start: 2017-11-01 | End: 2017-11-03

## 2017-11-01 RX ORDER — OXYCODONE HYDROCHLORIDE 5 MG/1
5 TABLET ORAL
Qty: 0 | Refills: 0 | Status: DISCONTINUED | OUTPATIENT
Start: 2017-11-01 | End: 2017-11-03

## 2017-11-01 RX ORDER — DOCUSATE SODIUM 100 MG
100 CAPSULE ORAL
Qty: 0 | Refills: 0 | Status: DISCONTINUED | OUTPATIENT
Start: 2017-11-01 | End: 2017-11-03

## 2017-11-01 RX ORDER — FERROUS SULFATE 325(65) MG
325 TABLET ORAL
Qty: 0 | Refills: 0 | Status: DISCONTINUED | OUTPATIENT
Start: 2017-11-01 | End: 2017-11-03

## 2017-11-01 RX ORDER — ASCORBIC ACID 60 MG
500 TABLET,CHEWABLE ORAL DAILY
Qty: 0 | Refills: 0 | Status: DISCONTINUED | OUTPATIENT
Start: 2017-11-01 | End: 2017-11-03

## 2017-11-01 RX ADMIN — Medication 975 MILLIGRAM(S): at 12:45

## 2017-11-01 RX ADMIN — HEPARIN SODIUM 5000 UNIT(S): 5000 INJECTION INTRAVENOUS; SUBCUTANEOUS at 20:47

## 2017-11-01 RX ADMIN — HEPARIN SODIUM 5000 UNIT(S): 5000 INJECTION INTRAVENOUS; SUBCUTANEOUS at 08:00

## 2017-11-01 RX ADMIN — Medication 325 MILLIGRAM(S): at 08:13

## 2017-11-01 RX ADMIN — Medication 300 MILLIGRAM(S): at 12:23

## 2017-11-01 RX ADMIN — Medication 325 MILLIGRAM(S): at 17:56

## 2017-11-01 RX ADMIN — Medication 600 MILLIGRAM(S): at 18:45

## 2017-11-01 RX ADMIN — Medication 500 MILLIGRAM(S): at 12:23

## 2017-11-01 RX ADMIN — Medication 600 MILLIGRAM(S): at 03:09

## 2017-11-01 RX ADMIN — OXYCODONE HYDROCHLORIDE 5 MILLIGRAM(S): 5 TABLET ORAL at 09:00

## 2017-11-01 RX ADMIN — Medication 100 MILLIGRAM(S): at 07:01

## 2017-11-01 RX ADMIN — Medication 975 MILLIGRAM(S): at 08:00

## 2017-11-01 RX ADMIN — OXYCODONE HYDROCHLORIDE 5 MILLIGRAM(S): 5 TABLET ORAL at 05:30

## 2017-11-01 RX ADMIN — Medication 300 MILLIGRAM(S): at 20:47

## 2017-11-01 RX ADMIN — OXYCODONE HYDROCHLORIDE 5 MILLIGRAM(S): 5 TABLET ORAL at 13:15

## 2017-11-01 RX ADMIN — Medication 975 MILLIGRAM(S): at 12:25

## 2017-11-01 RX ADMIN — Medication 975 MILLIGRAM(S): at 07:00

## 2017-11-01 RX ADMIN — OXYCODONE HYDROCHLORIDE 5 MILLIGRAM(S): 5 TABLET ORAL at 04:47

## 2017-11-01 RX ADMIN — Medication 600 MILLIGRAM(S): at 04:00

## 2017-11-01 RX ADMIN — Medication 300 MILLIGRAM(S): at 03:56

## 2017-11-01 RX ADMIN — OXYCODONE HYDROCHLORIDE 5 MILLIGRAM(S): 5 TABLET ORAL at 15:40

## 2017-11-01 RX ADMIN — OXYCODONE HYDROCHLORIDE 5 MILLIGRAM(S): 5 TABLET ORAL at 02:15

## 2017-11-01 RX ADMIN — OXYCODONE HYDROCHLORIDE 5 MILLIGRAM(S): 5 TABLET ORAL at 01:30

## 2017-11-01 RX ADMIN — Medication 975 MILLIGRAM(S): at 20:15

## 2017-11-01 RX ADMIN — Medication 600 MILLIGRAM(S): at 10:45

## 2017-11-01 RX ADMIN — Medication 975 MILLIGRAM(S): at 01:29

## 2017-11-01 RX ADMIN — Medication 975 MILLIGRAM(S): at 02:15

## 2017-11-01 RX ADMIN — OXYCODONE HYDROCHLORIDE 5 MILLIGRAM(S): 5 TABLET ORAL at 12:30

## 2017-11-01 RX ADMIN — OXYCODONE HYDROCHLORIDE 5 MILLIGRAM(S): 5 TABLET ORAL at 16:15

## 2017-11-01 RX ADMIN — Medication 600 MILLIGRAM(S): at 18:00

## 2017-11-01 RX ADMIN — Medication 600 MILLIGRAM(S): at 10:30

## 2017-11-01 RX ADMIN — Medication 100 MILLIGRAM(S): at 17:56

## 2017-11-01 RX ADMIN — Medication 325 MILLIGRAM(S): at 12:25

## 2017-11-01 RX ADMIN — Medication 1 TABLET(S): at 12:31

## 2017-11-01 RX ADMIN — Medication 975 MILLIGRAM(S): at 19:28

## 2017-11-01 RX ADMIN — OXYCODONE HYDROCHLORIDE 5 MILLIGRAM(S): 5 TABLET ORAL at 08:15

## 2017-11-01 NOTE — PROGRESS NOTE ADULT - SUBJECTIVE AND OBJECTIVE BOX
ANESTHESIA POSTOP CHECK    37y Female POSTOP DAY 1 S/P     Vital Signs Last 24 Hrs  T(C): 36.7 (01 Nov 2017 06:00), Max: 37.3 (31 Oct 2017 20:00)  T(F): 98.1 (01 Nov 2017 06:00), Max: 99.2 (31 Oct 2017 20:00)  HR: 80 (01 Nov 2017 06:00) (57 - 91)  BP: 126/60 (01 Nov 2017 06:00) (109/74 - 147/86)  BP(mean): 87 (31 Oct 2017 20:00) (72 - 102)  RR: 16 (01 Nov 2017 06:00) (14 - 20)  SpO2: 100% (01 Nov 2017 06:00) (95% - 100%)  I&O's Summary    31 Oct 2017 07:01  -  01 Nov 2017 07:00  --------------------------------------------------------  IN: 1700 mL / OUT: 4830 mL / NET: -3130 mL        [x ] NO APPARENT ANESTHESIA COMPLICATIONS      Comments:

## 2017-11-01 NOTE — PROGRESS NOTE ADULT - PROBLEM SELECTOR PLAN 1
- Continue regular diet.  - Increase ambulation.  - Continue motrin, tylenol, oxycodone PRN for pain control.   - Discontinue caceres catheter at 24 hours post-op   -AM Hct is 23.2. Pt this AM is asymptomatic . On Fe supplementation

## 2017-11-01 NOTE — PROGRESS NOTE ADULT - SUBJECTIVE AND OBJECTIVE BOX
po day1  pt had minimal prenatal care in [place] and followed up w visit to [facility] Dr Mccord where anemia was found and pt sent to [facility] the next day.  repeat section performed due to PEC.   no records from [facility] available,  pt desires discharge on po2 since her insurance not accepted at [facility].  GM

## 2017-11-01 NOTE — PROGRESS NOTE ADULT - SUBJECTIVE AND OBJECTIVE BOX
Postop Day  __1_ s/p   C- Section    THERAPY:  [ x ] Spinal morphine   [  ] Epidural morphine   [  ] IV PCA Hydromorphone 1 mg/ml    T(C): 36.7 (11-01-17 @ 06:00), Max: 36.7 (11-01-17 @ 06:00)  HR: 80 (11-01-17 @ 06:00) (70 - 80)  BP: 126/60 (11-01-17 @ 06:00) (126/60 - 133/67)  RR: 16 (11-01-17 @ 06:00) (16 - 16)  SpO2: 100% (11-01-17 @ 06:00) (100% - 100%)    MEDICATIONS  (STANDING):  acetaminophen   Tablet. 975 milliGRAM(s) Oral every 6 hours  ascorbic acid 500 milliGRAM(s) Oral daily  diphtheria/tetanus/pertussis (acellular) Vaccine (ADAcel) 0.5 milliLiter(s) IntraMuscular once  docusate sodium 100 milliGRAM(s) Oral two times a day  ferrous    sulfate 325 milliGRAM(s) Oral three times a day with meals  heparin  Injectable 5000 Unit(s) SubCutaneous every 12 hours  ibuprofen  Tablet 600 milliGRAM(s) Oral every 6 hours  influenza   Vaccine 0.5 milliLiter(s) IntraMuscular once  labetalol 300 milliGRAM(s) Oral three times a day  oxyCODONE    IR 5 milliGRAM(s) Oral every 3 hours  prenatal multivitamin 1 Tablet(s) Oral daily    MEDICATIONS  (PRN):  diphenhydrAMINE   Capsule 25 milliGRAM(s) Oral every 6 hours PRN Itching  glycerin Suppository - Adult 1 Suppository(s) Rectal at bedtime PRN Constipation  lanolin Ointment 1 Application(s) Topical every 3 hours PRN Sore Nipples  oxyCODONE    IR 5 milliGRAM(s) Oral every 4 hours PRN Severe Pain (7 - 10)  simethicone 80 milliGRAM(s) Chew every 4 hours PRN Gas      Pain:   _____mild______ at rest;  ______mild_____with activity    Sedation Score:	  [ x ] Alert	    [  ] Drowsy        [  ] Arousable	[  ] Asleep	[  ] Unresponsive    Side Effects:	  [  ] None	     [ x ] Nausea        [  ] Pruritus        [  ] Weakness   [  ] Numbness        ASSESSMENT/ PLAN  [  x ] Side effects resolving      [ x  ] Patient made aware of PRN meds available     [ x] Discontinue & switch to PRN pain medications        [  ] Continue       Comments: Patient states lower extremities feel and move normally. No apparent anesthetic complications.

## 2017-11-01 NOTE — PROGRESS NOTE ADULT - SUBJECTIVE AND OBJECTIVE BOX
OB Progress Note:   Delivery, POD#1    S: 38yo  POD#1 s/p rLTCS . Her pain is well controlled. She is tolerating a regular diet and passing flatus. Denies N/V. Denies CP/SOB/lightheadedness/dizziness. She is ambulating without difficulty.   Indwelling catheter is in place.     O:   Vital Signs Last 24 Hrs  T(C): 36.7 (2017 06:00), Max: 37.3 (31 Oct 2017 20:00)  T(F): 98.1 (2017 06:00), Max: 99.2 (31 Oct 2017 20:00)  HR: 80 (2017 06:00) (57 - 91)  BP: 126/60 (2017 06:00) (109/74 - 148/86)  BP(mean): 87 (31 Oct 2017 20:00) (72 - 102)  RR: 16 (2017 06:00) (14 - 20)  SpO2: 100% (2017 06:00) (95% - 100%)    Labs:  Blood type: O Positive  Rubella IgG: RPR: Negative                          7.2<L>   13.66<H> >-----------< 327    (  @ 06:30 )             23.2<L>                        9.5<L>   15.32<H> >-----------< 319    ( 10-31 @ 04:30 )             30.1<L>                        8.0<L>   8.51 >-----------< 346    ( 10-30 @ 21:06 )             26.2<L>                        7.0<LL>   7.21 >-----------< 372    ( 10-30 @ 14:00 )             24.5<L>    10-31-17 @ 04:30      138  |  103  |  9   ----------------------------<  116<H>  3.9   |  22  |  0.58    10-30-17 @ 13:40      137  |  103  |  10  ----------------------------<  79  4.1   |  22  |  0.74        Ca    7.6<L>      31 Oct 2017 04:30  Ca    8.2<L>      30 Oct 2017 13:40    TPro  6.1  /  Alb  2.9<L>  /  TBili  0.5  /  DBili  x   /  AST  42<H>  /  ALT  38<H>  /  AlkPhos  274<H>  10-31-17 @ 04:30  TPro  6.7  /  Alb  2.9<L>  /  TBili  0.3  /  DBili  x   /  AST  51<H>  /  ALT  46<H>  /  AlkPhos  320<H>  10-30-17 @ 13:40          PE:  General: NAD  Abdomen: Mildly distended, appropriately tender, incision c/d/i.  Extremities: No erythema, no pitting edema            Latoya Nunes, PGY-1

## 2017-11-02 DIAGNOSIS — F31.9 BIPOLAR DISORDER, UNSPECIFIED: ICD-10-CM

## 2017-11-02 LAB
HCT VFR BLD CALC: 22 % — LOW (ref 34.5–45)
HGB BLD-MCNC: 6.8 G/DL — CRITICAL LOW (ref 11.5–15.5)
MCHC RBC-ENTMCNC: 22.7 PG — LOW (ref 27–34)
MCHC RBC-ENTMCNC: 30.9 % — LOW (ref 32–36)
MCV RBC AUTO: 73.3 FL — LOW (ref 80–100)
NRBC # FLD: 0.03 — SIGNIFICANT CHANGE UP
PLATELET # BLD AUTO: 309 K/UL — SIGNIFICANT CHANGE UP (ref 150–400)
PMV BLD: 11.2 FL — SIGNIFICANT CHANGE UP (ref 7–13)
RBC # BLD: 3 M/UL — LOW (ref 3.8–5.2)
RBC # FLD: 21.2 % — HIGH (ref 10.3–14.5)
WBC # BLD: 15.31 K/UL — HIGH (ref 3.8–10.5)
WBC # FLD AUTO: 15.31 K/UL — HIGH (ref 3.8–10.5)

## 2017-11-02 PROCEDURE — 90792 PSYCH DIAG EVAL W/MED SRVCS: CPT

## 2017-11-02 RX ORDER — DIPHENHYDRAMINE HCL 50 MG
25 CAPSULE ORAL ONCE
Qty: 0 | Refills: 0 | Status: COMPLETED | OUTPATIENT
Start: 2017-11-02 | End: 2017-11-02

## 2017-11-02 RX ADMIN — Medication 1 TABLET(S): at 14:42

## 2017-11-02 RX ADMIN — OXYCODONE HYDROCHLORIDE 5 MILLIGRAM(S): 5 TABLET ORAL at 09:15

## 2017-11-02 RX ADMIN — Medication 300 MILLIGRAM(S): at 06:19

## 2017-11-02 RX ADMIN — Medication 325 MILLIGRAM(S): at 17:57

## 2017-11-02 RX ADMIN — Medication 600 MILLIGRAM(S): at 08:55

## 2017-11-02 RX ADMIN — Medication 325 MILLIGRAM(S): at 08:56

## 2017-11-02 RX ADMIN — Medication 325 MILLIGRAM(S): at 14:43

## 2017-11-02 RX ADMIN — OXYCODONE HYDROCHLORIDE 5 MILLIGRAM(S): 5 TABLET ORAL at 08:55

## 2017-11-02 RX ADMIN — OXYCODONE HYDROCHLORIDE 5 MILLIGRAM(S): 5 TABLET ORAL at 22:12

## 2017-11-02 RX ADMIN — Medication 300 MILLIGRAM(S): at 22:08

## 2017-11-02 RX ADMIN — HEPARIN SODIUM 5000 UNIT(S): 5000 INJECTION INTRAVENOUS; SUBCUTANEOUS at 09:47

## 2017-11-02 RX ADMIN — Medication 300 MILLIGRAM(S): at 14:42

## 2017-11-02 RX ADMIN — Medication 500 MILLIGRAM(S): at 14:42

## 2017-11-02 RX ADMIN — OXYCODONE HYDROCHLORIDE 5 MILLIGRAM(S): 5 TABLET ORAL at 23:02

## 2017-11-02 RX ADMIN — Medication 100 MILLIGRAM(S): at 06:19

## 2017-11-02 RX ADMIN — HEPARIN SODIUM 5000 UNIT(S): 5000 INJECTION INTRAVENOUS; SUBCUTANEOUS at 21:20

## 2017-11-02 RX ADMIN — Medication 975 MILLIGRAM(S): at 22:08

## 2017-11-02 RX ADMIN — Medication 600 MILLIGRAM(S): at 17:58

## 2017-11-02 RX ADMIN — Medication 600 MILLIGRAM(S): at 17:02

## 2017-11-02 RX ADMIN — Medication 600 MILLIGRAM(S): at 09:15

## 2017-11-02 RX ADMIN — Medication 100 MILLIGRAM(S): at 17:02

## 2017-11-02 RX ADMIN — Medication 975 MILLIGRAM(S): at 22:50

## 2017-11-02 RX ADMIN — Medication 25 MILLIGRAM(S): at 20:49

## 2017-11-02 NOTE — PROVIDER CONTACT NOTE (MEDICATION) - BACKGROUND
Pt. is a C/S from 10/31 @0040. EBL: 1100. Patient history of percocet use during pregnancy, infant in NICU for withdrawal. Patient history of bipolar diagnosis.

## 2017-11-02 NOTE — PROGRESS NOTE ADULT - SUBJECTIVE AND OBJECTIVE BOX
po2:  discharge to be delayed until 11/3 as patient for psych evaluation and ACS investigation.  Baby in NICU for apparent withdrawal symptoms.  Pt now admits to percocet usage throughout pregnancy.   No records presented from [facility] as to any prenatal care prior to appearing at his office 10/29.  HERNAN

## 2017-11-02 NOTE — BEHAVIORAL HEALTH ASSESSMENT NOTE - SUMMARY
Pt is a 36yo  female, s/p Cesarian, domiciled with  and 5 children (currently under care of pt's ), with hx of Bipolar d/o, no past psychiatric hospitalizations, no past suicide attempts, current Percocet use, PMH anemia and pre-eclampsia.  Pt's  baby found to be in withdrawal.  Pt admitted to Percocet use during pregnancy.  Psych consulted for evaluation.    On exam, pt AAOx4.  She is anxious and fidgety.  Presents with dilated pupils.  Denies opioid withdrawal symptoms.  She denies depressed or manic mood at present.  Denies SI/HI or thoughts to harm baby or children.  Denies AH/VH or psychotic symptoms.  Pt's  also denies safety concerns.  Pt will have support from , mother, 's family, and  upon discharge.   states he will take pt for psych f/u upon discharge.  He and pt both verbalize understanding of s/s of PPD and need to seek help if symptoms develop.  Pt does not present an acute risk of harm to self, children, or others.  She does not require inpatient psychiatric hospitalization.

## 2017-11-02 NOTE — PROGRESS NOTE ADULT - SUBJECTIVE AND OBJECTIVE BOX
Patient seen and examined at bedside, no acute overnight events. No acute complaints, pain well controlled. Denies any HA, blurry vision, lightheadedness, CP/SOB. Does report some mild nausea but denies vomiting. Patient is ambulating, voiding spontaneously, passing flatus, and tolerating regular diet. Pt is breast and bottle feeding her baby.    Vital Signs Last 24 Hours  T(C): 36.7 (11-02-17 @ 06:40), Max: 37.3 (11-01-17 @ 10:30)  HR: 60 (11-02-17 @ 06:40) (60 - 79)  BP: 125/75 (11-02-17 @ 06:40) (111/58 - 133/75)  RR: 18 (11-02-17 @ 06:40) (16 - 18)  SpO2: 100% (11-02-17 @ 06:40) (98% - 100%)    Physical Exam:  General: NAD  CV: RRR  Pulm: CTAB  Abdomen: Soft, non-tender, non-distended  Incision: Pfannenstiel incision CDI, subcuticular suture closure with steri strips   Pelvic: Lochia wnl; fundus firm and non-tender   Extremities: no calf tenderness noted on exam    Labs:    Blood Type: O Positive  Antibody Screen: --  RPR: Negative               6.8    15.31 )-----------( 309      ( 11-02 @ 06:30 )             22.0                7.2    13.66 )-----------( 327      ( 11-01 @ 06:30 )             23.2                9.5    15.32 )-----------( 319      ( 10-31 @ 04:30 )             30.1         MEDICATIONS  (STANDING):  acetaminophen   Tablet. 975 milliGRAM(s) Oral every 6 hours  ascorbic acid 500 milliGRAM(s) Oral daily  diphtheria/tetanus/pertussis (acellular) Vaccine (ADAcel) 0.5 milliLiter(s) IntraMuscular once  docusate sodium 100 milliGRAM(s) Oral two times a day  ferrous    sulfate 325 milliGRAM(s) Oral three times a day with meals  heparin  Injectable 5000 Unit(s) SubCutaneous every 12 hours  ibuprofen  Tablet 600 milliGRAM(s) Oral every 6 hours  labetalol 300 milliGRAM(s) Oral three times a day  oxyCODONE    IR 5 milliGRAM(s) Oral every 3 hours  prenatal multivitamin 1 Tablet(s) Oral daily    MEDICATIONS  (PRN):  diphenhydrAMINE   Capsule 25 milliGRAM(s) Oral every 6 hours PRN Itching  glycerin Suppository - Adult 1 Suppository(s) Rectal at bedtime PRN Constipation  lanolin Ointment 1 Application(s) Topical every 3 hours PRN Sore Nipples  oxyCODONE    IR 5 milliGRAM(s) Oral every 4 hours PRN Severe Pain (7 - 10)  simethicone 80 milliGRAM(s) Chew every 4 hours PRN Gas

## 2017-11-02 NOTE — BEHAVIORAL HEALTH ASSESSMENT NOTE - NSBHCHARTREVIEWVS_PSY_A_CORE FT
Vital Signs Last 24 Hrs  T(C): 36.7 (02 Nov 2017 18:00), Max: 36.8 (02 Nov 2017 10:33)  T(F): 98 (02 Nov 2017 18:00), Max: 98.2 (02 Nov 2017 10:33)  HR: 72 (02 Nov 2017 18:00) (60 - 94)  BP: 131/81 (02 Nov 2017 18:00) (111/58 - 131/81)  BP(mean): --  RR: 18 (02 Nov 2017 18:00) (17 - 20)  SpO2: 100% (02 Nov 2017 18:00) (98% - 100%)

## 2017-11-02 NOTE — BEHAVIORAL HEALTH ASSESSMENT NOTE - HPI (INCLUDE ILLNESS QUALITY, SEVERITY, DURATION, TIMING, CONTEXT, MODIFYING FACTORS, ASSOCIATED SIGNS AND SYMPTOMS)
Pt is a 38yo  female, s/p Cesarian, domiciled with  and 5 children (currently under care of pt's ), with hx of Bipolar d/o, no past psychiatric hospitalizations, no past suicide attempts, current Percocet use, PMH anemia and pre-eclampsia.  Pt's  baby found to be in withdrawal.  Pt admitted to Percocet use during pregnancy.  Psych consulted for evaluation.    Seen and examined at bedside.  Pt AAOx4.  She reports feeling anxious about ramifications of Percocet use.  She is fidgety with dilated pupils.  Denies opioid withdrawal symptoms.  Reports poor sleep, sweating, and restlessness due to hospitalization and being bored here.  She reports experiencing severe back pain starting ~10 days ago.  States she took Percocet (5/325) 2 tabs every other day for last 10 days.  States she had Percocet from previous prescriptions after root canal and plastic surgery.  Denies obtaining Percocet from other sources.  Denies Percocet use prior to this time.  Denies other substance use or psychiatric medications during pregnancy.  She reports pregnancy was unexpected but easy.  Denies acute mood symptoms during pregnancy or currently.  Denies SI/HI or thoughts of harming baby or children.  Denies AH/VH or psychotic symptoms.  Reports PPD after each previous pregnancy.  Also reports AH/VH after pregnancy 5 years ago.  States she started taking medication each time and stabilized.  Pt verbalizes understanding of s/s of PPD and need to seek help if symptoms develop.  She identifies , mother, and 's family as supports upon returning home.  Also reports  is employed for additional support.      Pt reports outpt treatment with psychiatrist prior to pregnancy.  Reports inconsistent compliance with Lithium and took them when she felt changes in her mood.  Also reports occasional Xanax use prescribed by psychiatrist.  Denies past psychiatric hospitalizations though  recalls distant hospitalization before marriage 15+ years ago.  Denies past suicide attempts.      Spoke with pt's .  He reports mood lability, particularly elevated mood, during pregnancy.  States pt would not sleep and clean the house.  Also reports pt did not want to go to doctor.  States pt saw OB 4-5 times during pregnancy.  Denies mood symptoms presently.  He verbalizes understanding of s/s of PPD and states he will take pt for psych f/u upon discharge.  He denies safety concerns for pt, baby, or children.  Reports occasional alcohol and marijuana use prior to pregnancy. Pt is a 38yo  female, s/p Cesarian, domiciled with  and 5 children (currently under care of pt's ), with hx of Bipolar d/o, no past psychiatric hospitalizations, no past suicide attempts, current Percocet use, PMH anemia and pre-eclampsia.  Pt's  baby found to be in withdrawal.  Pt admitted to Percocet use during pregnancy.  Psych consulted for evaluation.    Seen and examined at bedside.  Pt AAOx4.  She reports feeling anxious about ramifications of Percocet use.  She is fidgety with dilated pupils.  Denies opioid withdrawal symptoms.  Reports poor sleep, sweating, and restlessness due to hospitalization and being bored here.  She reports experiencing severe back pain starting ~10 days ago.  States she took Percocet (5/325) 2 tabs every other day for last 10 days.  States she had Percocet from previous prescriptions after root canal and plastic surgery.  Denies obtaining Percocet from other sources.  Denies Percocet use prior to this time.  Denies other substance use or psychiatric medications during pregnancy.  She reports pregnancy was unexpected but easy.  Denies acute mood symptoms during pregnancy or currently.  Denies SI/HI or thoughts of harming baby or children.  Denies AH/VH or psychotic symptoms.  Reports PPD after each previous pregnancy.  Also reports AH/VH after pregnancy 5 years ago.  States she started taking medication each time and stabilized.  Pt verbalizes understanding of s/s of PPD and need to seek help if symptoms develop.  She identifies , mother, and 's family as supports upon returning home.  Also reports  is employed for additional support.      Pt reports outpt treatment with psychiatrist prior to pregnancy.  Reports inconsistent compliance with Lithium and took them when she felt changes in her mood.  Also reports occasional Xanax use prescribed by psychiatrist.  Denies past psychiatric hospitalizations though  recalls distant hospitalization before marriage 15+ years ago.  Denies past suicide attempts.      Spoke with pt's .  He reports mood lability, particularly elevated mood, during pregnancy.  States pt would not sleep and clean the house.  Also reports pt did not want to go to doctor.  States pt saw OB 4-5 times during pregnancy.  Denies mood symptoms presently.  He verbalizes understanding of s/s of PPD and states he will take pt for psych f/u upon discharge.  He denies safety concerns for pt, baby, or children.  Reports occasional alcohol and marijuana use prior to pregnancy.    Checked iSTOP for Alice Posey, Alice Martinez, Alice Fang.  No results from any entry.

## 2017-11-02 NOTE — PROGRESS NOTE ADULT - PROBLEM SELECTOR PLAN 1
- Continue with po analgesia  - Increase ambulation  - Continue regular diet  - IV lock  - sPEC: s/p Mg, continues on labetalol 300 TID. Will continue to monitor. Patient denies any HA, blurry vision or RUQ pain.  - H/H 6.8/22 this morning, will obtain set of orthostatics and continue to monitor    GO Mills, PGY-1

## 2017-11-02 NOTE — BEHAVIORAL HEALTH ASSESSMENT NOTE - RISK ASSESSMENT
Risk factors - substance use during pregnancy, not currently on meds, acute social stressors related to Percocet use  Protective factors - stable domicile, , multiple family supports, future oriented, willingness to seek care    Pt denies SI/HI/AH/VH, psychotic or manic symptoms.  She does not present an acute risk of harm to self, children, or others.  She does not require inpatient psychiatric admission.

## 2017-11-02 NOTE — BEHAVIORAL HEALTH ASSESSMENT NOTE - CASE SUMMARY
38 yo Female with Bipolar disorder who is currently stable psychiatrically  1-at present pt does not there are no contraindications for discharge, no safetly concerns  2-collateral obtained, and out patient referrals given.

## 2017-11-02 NOTE — PROVIDER CONTACT NOTE (MEDICATION) - ASSESSMENT
Patient requesting oxycodone for pain management. Patient has taken motrin and tylenol and scores pain 8/10.

## 2017-11-03 VITALS
HEART RATE: 66 BPM | DIASTOLIC BLOOD PRESSURE: 70 MMHG | RESPIRATION RATE: 16 BRPM | TEMPERATURE: 99 F | OXYGEN SATURATION: 100 % | SYSTOLIC BLOOD PRESSURE: 136 MMHG

## 2017-11-03 LAB
ALBUMIN SERPL ELPH-MCNC: 2.8 G/DL — LOW (ref 3.3–5)
ALP SERPL-CCNC: 180 U/L — HIGH (ref 40–120)
ALT FLD-CCNC: 24 U/L — SIGNIFICANT CHANGE UP (ref 4–33)
APTT BLD: 26.4 SEC — LOW (ref 27.5–37.4)
AST SERPL-CCNC: 34 U/L — HIGH (ref 4–32)
BILIRUB SERPL-MCNC: 0.2 MG/DL — SIGNIFICANT CHANGE UP (ref 0.2–1.2)
BUN SERPL-MCNC: 10 MG/DL — SIGNIFICANT CHANGE UP (ref 7–23)
CALCIUM SERPL-MCNC: 8.1 MG/DL — LOW (ref 8.4–10.5)
CHLORIDE SERPL-SCNC: 109 MMOL/L — HIGH (ref 98–107)
CO2 SERPL-SCNC: 19 MMOL/L — LOW (ref 22–31)
CREAT SERPL-MCNC: 0.61 MG/DL — SIGNIFICANT CHANGE UP (ref 0.5–1.3)
GLUCOSE SERPL-MCNC: 100 MG/DL — HIGH (ref 70–99)
HCT VFR BLD CALC: 24.3 % — LOW (ref 34.5–45)
HGB BLD-MCNC: 7.6 G/DL — LOW (ref 11.5–15.5)
INR BLD: 1.06 — SIGNIFICANT CHANGE UP (ref 0.88–1.17)
MCHC RBC-ENTMCNC: 23.4 PG — LOW (ref 27–34)
MCHC RBC-ENTMCNC: 31.3 % — LOW (ref 32–36)
MCV RBC AUTO: 74.8 FL — LOW (ref 80–100)
NRBC # FLD: 0.08 — SIGNIFICANT CHANGE UP
PLATELET # BLD AUTO: 353 K/UL — SIGNIFICANT CHANGE UP (ref 150–400)
PMV BLD: 10.8 FL — SIGNIFICANT CHANGE UP (ref 7–13)
POTASSIUM SERPL-MCNC: 4 MMOL/L — SIGNIFICANT CHANGE UP (ref 3.5–5.3)
POTASSIUM SERPL-SCNC: 4 MMOL/L — SIGNIFICANT CHANGE UP (ref 3.5–5.3)
PROT SERPL-MCNC: 5.9 G/DL — LOW (ref 6–8.3)
PROTHROM AB SERPL-ACNC: 11.9 SEC — SIGNIFICANT CHANGE UP (ref 9.8–13.1)
RBC # BLD: 3.25 M/UL — LOW (ref 3.8–5.2)
RBC # FLD: 22.5 % — HIGH (ref 10.3–14.5)
SODIUM SERPL-SCNC: 143 MMOL/L — SIGNIFICANT CHANGE UP (ref 135–145)
URATE SERPL-MCNC: 4.3 MG/DL — SIGNIFICANT CHANGE UP (ref 2.5–7)
WBC # BLD: 13.76 K/UL — HIGH (ref 3.8–10.5)
WBC # FLD AUTO: 13.76 K/UL — HIGH (ref 3.8–10.5)

## 2017-11-03 RX ORDER — CALCIUM CARBONATE 500(1250)
1 TABLET ORAL EVERY 6 HOURS
Qty: 0 | Refills: 0 | Status: DISCONTINUED | OUTPATIENT
Start: 2017-11-03 | End: 2017-11-03

## 2017-11-03 RX ORDER — IBUPROFEN 200 MG
1 TABLET ORAL
Qty: 0 | Refills: 0 | COMMUNITY
Start: 2017-11-03

## 2017-11-03 RX ORDER — ACETAMINOPHEN 500 MG
3 TABLET ORAL
Qty: 0 | Refills: 0 | COMMUNITY
Start: 2017-11-03

## 2017-11-03 RX ADMIN — Medication 1 TABLET(S): at 03:10

## 2017-11-03 RX ADMIN — Medication 100 MILLIGRAM(S): at 06:17

## 2017-11-03 RX ADMIN — Medication 325 MILLIGRAM(S): at 12:17

## 2017-11-03 RX ADMIN — Medication 600 MILLIGRAM(S): at 12:14

## 2017-11-03 RX ADMIN — Medication 300 MILLIGRAM(S): at 14:00

## 2017-11-03 RX ADMIN — Medication 600 MILLIGRAM(S): at 13:12

## 2017-11-03 RX ADMIN — Medication 500 MILLIGRAM(S): at 12:16

## 2017-11-03 RX ADMIN — Medication 300 MILLIGRAM(S): at 06:17

## 2017-11-03 RX ADMIN — Medication 325 MILLIGRAM(S): at 12:15

## 2017-11-03 RX ADMIN — Medication 1 TABLET(S): at 12:15

## 2017-11-03 NOTE — PROGRESS NOTE ADULT - SUBJECTIVE AND OBJECTIVE BOX
OB Postpartum Note: Primary  Delivery, POD#3    S: 36yo  POD#3 s/p pLTCS. The patient feels well.  Pain is well controlled. She is tolerating a regular diet and passing flatus. She is voiding spontaneously, and ambulating without difficulty. Denies CP/SOB. Denies lightheadedness/dizziness. Denies N/V.    O:  Vitals:  Vital Signs Last 24 Hrs  T(C): 36.9 (2017 06:02), Max: 36.9 (2017 06:02)  T(F): 98.4 (2017 06:02), Max: 98.4 (2017 06:02)  HR: 62 (2017 06:02) (62 - 94)  BP: 125/76 (2017 06:02) (121/56 - 136/73)  BP(mean): --  RR: 18 (2017 06:02) (16 - 20)  SpO2: 99% (2017 06:02) (97% - 100%)    MEDICATIONS  (STANDING):  acetaminophen   Tablet. 975 milliGRAM(s) Oral every 6 hours  ascorbic acid 500 milliGRAM(s) Oral daily  diphtheria/tetanus/pertussis (acellular) Vaccine (ADAcel) 0.5 milliLiter(s) IntraMuscular once  docusate sodium 100 milliGRAM(s) Oral two times a day  ferrous    sulfate 325 milliGRAM(s) Oral three times a day with meals  heparin  Injectable 5000 Unit(s) SubCutaneous every 12 hours  ibuprofen  Tablet 600 milliGRAM(s) Oral every 6 hours  labetalol 300 milliGRAM(s) Oral three times a day  oxyCODONE    IR 5 milliGRAM(s) Oral every 3 hours  prenatal multivitamin 1 Tablet(s) Oral daily    MEDICATIONS  (PRN):  calcium carbonate 500 mG (Tums) Chewable 1 Tablet(s) Chew every 6 hours PRN Heartburn  diphenhydrAMINE   Capsule 25 milliGRAM(s) Oral every 6 hours PRN Itching  glycerin Suppository - Adult 1 Suppository(s) Rectal at bedtime PRN Constipation  lanolin Ointment 1 Application(s) Topical every 3 hours PRN Sore Nipples  oxyCODONE    IR 5 milliGRAM(s) Oral every 4 hours PRN Severe Pain (7 - 10)  simethicone 80 milliGRAM(s) Chew every 4 hours PRN Gas      LABS:  Blood type: O Positive  Rubella IgG: RPR: Negative                          6.8<LL>   15.31<H> >-----------< 309    (  @ 06:30 )             22.0<L>                        7.2<L>   13.66<H> >-----------< 327    (  @ 06:30 )             23.2<L>                  Physical exam:  Gen: NAD  Abdomen: Soft, nontender, no distension , firm uterine fundus at umbilicus.  Incision: Clean, dry, and intact   Pelvic: Normal lochia noted  Ext: No calf tenderness          Latoya Nunes PGY-1

## 2017-11-03 NOTE — PROGRESS NOTE ADULT - PROBLEM SELECTOR PLAN 1
- Pain well controlled, continue current pain regimen  - Increase ambulation, SCDs when not ambulating  - Continue regular diet  - D/C planning

## 2017-11-04 RX ORDER — LABETALOL HCL 100 MG
1 TABLET ORAL
Qty: 90 | Refills: 0 | OUTPATIENT
Start: 2017-11-04 | End: 2017-12-04

## 2024-11-23 NOTE — BEHAVIORAL HEALTH ASSESSMENT NOTE - NS ED [facility] MED ROS GENITOURINARY
ADMISSION DATE:11/22/2024  DISCHARGE DATE:    REASON FOR ADMISSION:  Unilateral primary osteoarthritis, right hip [M16.11]  Primary osteoarthritis of right hip [M16.11]    DISCHARGE DIAGNOSES:  same    OPERATIONS AND PROCEDURES:  TOTAL HIP REPLACEMENT [32181] (ARTHROPLASTY, HIP, TOTAL, ANTERIOR APPROACH)    CONSULTANTS:  None    COMPLICATIONS:  No complications    BRIEF HISTORY OF PRESENT ILLNESS:  Reinaldo Vargas is a 67 year old male presenting for presents today for evaluation of right-sided groin and thigh pain along with bilateral leg pain. He has a history of a left hip replacement. He is at worsening pain primarily on the right hip for the past several years. He rates his pain as severe. He notes it with walking, stairs, kneeling, getting up from a chair, putting his shoes and socks on. Walking is limited to about a block at a time. He takes Tylenol. He has a history of cardiac disease, he takes aspirin and Plavix. His pain interferes with household chores, exercise, social activities. He denies numbness and tingling radiating down to his feet.     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/2018    Obesity     PEDRO (obstructive sleep apnea) 11/20/2011    Other lymphedema 02/10/2012    PVC's (premature ventricular contractions) 11/15/2019    Sleep apnea     use cpap at night    Throat cancer  (CMD) 2009    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 Encinas    No past surgeries      Throat surgery  2009    Base of tongue squamous cell carcinoma removed    Thyroidectomy  2009    Hurthle cell carcinoma of thyroid removed    Total hip arthroplasty Right 11/22/2024    Dr. Sweet     Social History     Tobacco Use    Smoking status: Never     Passive exposure: Past    Smokeless tobacco: Never   Vaping Use    Vaping status: never used   Substance Use Topics    Alcohol use: Yes     Alcohol/week: 2.0 standard drinks of alcohol     Types: 1 Cans of beer, 1 Shots of liquor per week     Comment: weekly has beer    Drug use: No     Medications Prior to Admission   Medication Sig Dispense Refill    [DISCONTINUED] aspirin (ECOTRIN) 81 MG EC tablet Take 81 mg by mouth daily.      [DISCONTINUED] Multiple Vitamin (MULTIVITAMIN ADULT PO) Take 1 tablet by mouth daily.      [DISCONTINUED] CINNAMON PO Take 1 tablet by mouth in the morning and 1 tablet in the evening. Takes one tablet by mouth twice a day, unsure of strength      atorvastatin (LIPITOR) 40 MG tablet TAKE ONE TABLET BY MOUTH ONE TIME DAILY 90 tablet 3    furosemide (LASIX) 40 MG tablet TAKE ONE TABLET BY MOUTH ONE TIME DAILY 30 tablet 1    traZODone (DESYREL) 100 MG tablet Take 1/2 tab po 1 hr before sleep, after 4 days take full tab 1 hr before sleep (Patient taking differently: Take 100 mg by mouth nightly. take full tab 1 hr before sleep) 30 tablet 2    levothyroxine 200 MCG tablet TAKE ONE TABLET BY MOUTH ONE TIME DAILY 60 tablet 0    enalapril (VASOTEC) 10 MG tablet Take 2 tablets by mouth daily. 90 tablet 1    finasteride  (PROSCAR) 5 MG tablet Take 1 tablet by mouth daily. 90 tablet 3    tamsulosin (FLOMAX) 0.4 MG Cap Take 1 capsule by mouth in the morning and 1 capsule in the evening. 180 capsule 3    [DISCONTINUED] acetaminophen (TYLENOL) 500 MG tablet Take 1,000 mg by mouth as needed for Pain.       ALLERGIES:  No Known Allergies    For further information, please see admission history and physical.    HOSPITAL COURSE:  The patient was seen preoperatively by Reinaldo Dye DO and was taken to  the operating room where TOTAL HIP REPLACEMENT [94158] (ARTHROPLASTY, HIP, TOTAL, ANTERIOR APPROACH) was performed.  The patient tolerated the procedure, progressed in therapy and is Weight bear as tolerated  2 grams of Ancef was given for infection prophylaxis. Aspirin were given for DVT prophylaxis.       LABS:  Recent Labs   Lab 11/23/24  0524   SODIUM 137   POTASSIUM 4.5   CHLORIDE 102   CO2 26   BUN 17   CREATININE 0.91   GLUCOSE 161*       Recent Labs   Lab 11/23/24  0524   HGB 13.1   HCT 39.6       No results found    DIAGNOSTIC STUDIES:  No orders to display       DISCHARGE MEDICATIONS:  The patient is being discharged on the following medications:  Current Discharge Medication List        START taking these medications    Details   docusate sodium-sennosides (SENOKOT S) 50-8.6 MG per tablet Take 2 tablets by mouth 2 times daily as needed for Constipation.  Qty: 20 tablet, Refills: 0      oxyCODONE, IMM REL, (ROXICODONE) 5 MG immediate release tablet Take 1-2 tablets by mouth every 4 hours as needed for Pain.  Qty: 40 tablet, Refills: 0    Associated Diagnoses: Status post right hip replacement           CONTINUE these medications which have CHANGED    Details   acetaminophen (TYLENOL) 500 MG tablet Take 2 tablets by mouth every 8 hours.      aspirin (ECOTRIN) 81 MG EC tablet Take 1 tablet by mouth in the morning and 1 tablet in the evening.  Qty: 60 tablet, Refills: 0           CONTINUE these medications which have NOT CHANGED     Details   atorvastatin (LIPITOR) 40 MG tablet TAKE ONE TABLET BY MOUTH ONE TIME DAILY  Qty: 90 tablet, Refills: 3      furosemide (LASIX) 40 MG tablet TAKE ONE TABLET BY MOUTH ONE TIME DAILY  Qty: 30 tablet, Refills: 1      traZODone (DESYREL) 100 MG tablet Take 1/2 tab po 1 hr before sleep, after 4 days take full tab 1 hr before sleep  Qty: 30 tablet, Refills: 2      levothyroxine 200 MCG tablet TAKE ONE TABLET BY MOUTH ONE TIME DAILY  Qty: 60 tablet, Refills: 0    Comments: Appointment needed for future refills.  Associated Diagnoses: Postoperative hypothyroidism      enalapril (VASOTEC) 10 MG tablet Take 2 tablets by mouth daily.  Qty: 90 tablet, Refills: 1      finasteride (PROSCAR) 5 MG tablet Take 1 tablet by mouth daily.  Qty: 90 tablet, Refills: 3      tamsulosin (FLOMAX) 0.4 MG Cap Take 1 capsule by mouth in the morning and 1 capsule in the evening.  Qty: 180 capsule, Refills: 3    Associated Diagnoses: BPH associated with nocturia           STOP taking these medications       Multiple Vitamin (MULTIVITAMIN ADULT PO) Comments:   Reason for Stopping:         CINNAMON PO Comments:   Reason for Stopping:               DISCHARGE DISPOSITION: home    CONDITION:stable    ACTIVITY:  Weight bearing as tolerated    DIET: General  FOLLOWUP:  The patient can have staples removed in 2 weeks post-operatively.  Follow up in 2 week(s) at   [facility]  [address] [place] [address]  [phone].        Rosendo Hughes PA-C     No complaints